# Patient Record
Sex: MALE | Race: OTHER | Employment: FULL TIME | URBAN - METROPOLITAN AREA
[De-identification: names, ages, dates, MRNs, and addresses within clinical notes are randomized per-mention and may not be internally consistent; named-entity substitution may affect disease eponyms.]

---

## 2018-09-23 ENCOUNTER — HOSPITAL ENCOUNTER (EMERGENCY)
Age: 38
Discharge: HOME | End: 2018-09-23
Payer: COMMERCIAL

## 2018-09-23 VITALS
TEMPERATURE: 98.2 F | SYSTOLIC BLOOD PRESSURE: 117 MMHG | DIASTOLIC BLOOD PRESSURE: 72 MMHG | HEART RATE: 63 BPM | OXYGEN SATURATION: 99 % | RESPIRATION RATE: 16 BRPM

## 2018-09-23 DIAGNOSIS — L30.9 ECZEMA, UNSPECIFIED TYPE: Primary | ICD-10-CM

## 2018-09-23 PROCEDURE — 99282 EMERGENCY DEPT VISIT SF MDM: CPT

## 2018-09-23 ASSESSMENT — ENCOUNTER SYMPTOMS: RESPIRATORY NEGATIVE: 1

## 2018-09-23 ASSESSMENT — PAIN DESCRIPTION - ORIENTATION: ORIENTATION: RIGHT;LEFT

## 2018-09-23 ASSESSMENT — PAIN SCALES - GENERAL: PAINLEVEL_OUTOF10: 6

## 2018-09-23 ASSESSMENT — PAIN DESCRIPTION - LOCATION: LOCATION: NECK

## 2018-09-23 NOTE — ED PROVIDER NOTES
[id number] [facility]  eMERGENCY dEPARTMENT eNCOUnter      Pt Name: Shiva Spain  MRN: [id number]  [place] 1980  Date of evaluation: 9/23/2018  Provider: NAINA Baxter CNP     Evaluated by the advanced practice provider    [address] [place] [address]       Chief Complaint   Patient presents with    Rash     rash on both sides of neck for 1 year, worse with hot weather. HISTORY OF PRESENT ILLNESS  (Location/Symptom, Timing/Onset, Context/Setting, Quality, Duration, Modifying Factors, Severity.)   Shiva Spain is a 45 y.o. male who presents to the emergency department Complaining of an itchy scaly thick rash around his neck. Has been present intermittently for several years. Itches and burns from time to time. No other skin eruptions. Nursing Notes were reviewed and I agree. REVIEW OF SYSTEMS    (2-9 systems for level 4, 10 or more for level 5)     Review of Systems   Constitutional: Negative. HENT: Negative. Respiratory: Negative. Cardiovascular: Negative. Musculoskeletal: Negative for arthralgias, joint swelling, neck pain and neck stiffness. Skin: Positive for rash. Allergic/Immunologic: Negative for immunocompromised state. Except as noted above the remainder of the review of systems was reviewed and negative. PAST MEDICAL HISTORY   History reviewed. No pertinent past medical history. SURGICAL HISTORY     History reviewed. No pertinent surgical history. CURRENT MEDICATIONS       Discharge Medication List as of 9/23/2018  7:13 PM          ALLERGIES     Patient has no known allergies. FAMILY HISTORY     History reviewed. No pertinent family history. No family status information on file. SOCIAL HISTORY      reports that he has been smoking Cigarettes. He has been smoking about 0.25 packs per day. He has never used smokeless tobacco. He reports that he does not drink alcohol.     PHYSICAL EXAM    (up to 7 for level 4, 8 or more for level 5)     ED Triage Vitals   BP Temp Temp src Pulse Resp SpO2 Height Weight   -- -- -- -- -- -- -- --       Physical Exam   Constitutional: He is oriented to person, place, and time. He appears well-developed and well-nourished. No distress. HENT:   Head: Normocephalic. Neck: Normal range of motion. Neck supple. Anatomical chart indicates area of mild hyperpigmentation, thickened mildly hypertrophied skin, scaly, flaky. Dry. Irregular borders. No central clearing. Cardiovascular: Normal rate. Pulmonary/Chest: Effort normal.   Neurological: He is alert and oriented to person, place, and time. Skin: Skin is warm and dry. Rash noted. He is not diaphoretic. Nursing note and vitals reviewed. DIAGNOSTIC RESULTS     RADIOLOGY:   Non-plain film images such as CT, Ultrasound and MRI are read by the radiologist. Plain radiographic images are visualized and preliminarily interpreted by NAINA Baxter CNP with the below findings:        Interpretation per the Radiologist below, if available at the time of this note:    No orders to display       LABS:  Labs Reviewed - No data to display    All other labs were within normal range or not returned as of this dictation. EMERGENCY DEPARTMENT COURSE and DIFFERENTIAL DIAGNOSIS/MDM:   Vitals:    Vitals:    09/23/18 1837   BP: 117/72   Pulse: 63   Resp: 16   Temp: 98.2 °F (36.8 °C)   TempSrc: Oral   SpO2: 99%       MDM    Differential diagnosis: Eczema, atopic dermatitis, tinea  Patient was seen and evaluated per myself. Dr. Lazaro Host present and available for consultation as needed. Patient be given triamcinolone for treatment. He is referred to internal medicine for outpatient follow-up. There was no clinical evidence of herpes zoster, cellulitis, abscess, psoriasis. There was no evidence of pityriasis. Impression and plan of care was discussed with the patient.  He is in agreement knees discharged home in good

## 2019-04-14 ENCOUNTER — HOSPITAL ENCOUNTER (EMERGENCY)
Age: 39
Discharge: HOME | End: 2019-04-14
Attending: EMERGENCY MEDICINE
Payer: COMMERCIAL

## 2019-04-14 VITALS
SYSTOLIC BLOOD PRESSURE: 122 MMHG | RESPIRATION RATE: 16 BRPM | DIASTOLIC BLOOD PRESSURE: 77 MMHG | TEMPERATURE: 97.7 F | BODY MASS INDEX: 17.37 KG/M2 | HEIGHT: 68 IN | OXYGEN SATURATION: 100 % | WEIGHT: 114.64 LBS | HEART RATE: 65 BPM

## 2019-04-14 DIAGNOSIS — B07.0 PLANTAR WART OF RIGHT FOOT: ICD-10-CM

## 2019-04-14 DIAGNOSIS — B35.0 TINEA CAPITIS: Primary | ICD-10-CM

## 2019-04-14 DIAGNOSIS — L30.9 ECZEMA, UNSPECIFIED TYPE: ICD-10-CM

## 2019-04-14 PROCEDURE — 99282 EMERGENCY DEPT VISIT SF MDM: CPT

## 2019-04-14 RX ORDER — TRIAMCINOLONE ACETONIDE 1 MG/G
CREAM TOPICAL
Qty: 1 TUBE | Refills: 0 | Status: SHIPPED | OUTPATIENT
Start: 2019-04-14 | End: 2020-02-22 | Stop reason: ALTCHOICE

## 2019-04-14 RX ORDER — TERBINAFINE HYDROCHLORIDE 250 MG/1
250 TABLET ORAL DAILY
Qty: 42 TABLET | Refills: 0 | Status: SHIPPED | OUTPATIENT
Start: 2019-04-14 | End: 2019-05-26

## 2019-04-14 ASSESSMENT — PAIN DESCRIPTION - PROGRESSION
CLINICAL_PROGRESSION_2: GRADUALLY WORSENING
CLINICAL_PROGRESSION: GRADUALLY WORSENING

## 2019-04-14 ASSESSMENT — PAIN SCALES - GENERAL
PAINLEVEL_OUTOF10: 8
PAINLEVEL_OUTOF10: 8

## 2019-04-14 ASSESSMENT — PAIN DESCRIPTION - PAIN TYPE: TYPE: ACUTE PAIN

## 2019-04-14 ASSESSMENT — PAIN DESCRIPTION - INTENSITY: RATING_2: 8

## 2019-04-14 ASSESSMENT — PAIN DESCRIPTION - FREQUENCY: FREQUENCY: CONTINUOUS

## 2019-04-14 ASSESSMENT — PAIN DESCRIPTION - DESCRIPTORS
DESCRIPTORS_2: ITCHING
DESCRIPTORS: ITCHING

## 2019-04-14 ASSESSMENT — PAIN DESCRIPTION - DURATION: DURATION_2: CONTINUOUS

## 2019-04-14 ASSESSMENT — PAIN DESCRIPTION - ONSET: ONSET: GRADUAL

## 2019-04-14 ASSESSMENT — PAIN - FUNCTIONAL ASSESSMENT
PAIN_FUNCTIONAL_ASSESSMENT: 0-10
PAIN_FUNCTIONAL_ASSESSMENT: ACTIVITIES ARE NOT PREVENTED

## 2019-04-14 ASSESSMENT — PAIN DESCRIPTION - LOCATION: LOCATION: NECK

## 2019-04-14 NOTE — ED PROVIDER NOTES
CHIEF COMPLAINT  Rash (pt presents to ED with acute onset of right sided neck/scalp rash that is constantly itching 8/10. + hyperpigmentation noted to right side of neck and anterior crown/scalp)      HISTORY OF PRESENT ILLNESS  Chepe Ballesteros is a 44 y.o. male who presents to the ED complaining of rash on the right side of his neck has been present for the past month. Also states he has 2 areas on his frontal scalp that are pruritic and have him present for the past month. States he has had some hair loss in the area. Denies any pain with the rashes. No drainage coming from the lesions. States he did have something similar approximately one year ago in which he used a cream which did provide relief. States he is not sure what cream he was using at the time. No fevers or chills. No other rashes on the body. Denies taking any medications daily. No new hygiene products, soaps or detergents. No known allergies. Also complains of a wart on his right foot near his heel. States it is painful. Denies any bleeding or swelling around the area. No other complaints, modifying factors or associated symptoms. Nursing notes reviewed. History reviewed. No pertinent past medical history. History reviewed. No pertinent surgical history. History reviewed. No pertinent family history.   Social History     Socioeconomic History    Marital status:      Spouse name: Not on file    Number of children: Not on file    Years of education: Not on file    Highest education level: Not on file   Occupational History    Not on file   Social Needs    Financial resource strain: Not on file    Food insecurity:     Worry: Not on file     Inability: Not on file    Transportation needs:     Medical: Not on file     Non-medical: Not on file   Tobacco Use    Smoking status: Current Every Day Smoker     Packs/day: 0.25     Types: Cigarettes    Smokeless tobacco: Never Used   Substance and Sexual Activity    Alcohol use: No    Drug use: Never    Sexual activity: Not on file   Lifestyle    Physical activity:     Days per week: Not on file     Minutes per session: Not on file    Stress: Not on file   Relationships    Social connections:     Talks on phone: Not on file     Gets together: Not on file     Attends Church service: Not on file     Active member of club or organization: Not on file     Attends meetings of clubs or organizations: Not on file     Relationship status: Not on file    Intimate partner violence:     Fear of current or ex partner: Not on file     Emotionally abused: Not on file     Physically abused: Not on file     Forced sexual activity: Not on file   Other Topics Concern    Not on file   Social History Narrative    Not on file     No current facility-administered medications for this encounter. Current Outpatient Medications   Medication Sig Dispense Refill    terbinafine (LAMISIL) 250 MG tablet Take 1 tablet by mouth daily 42 tablet 0    triamcinolone (KENALOG) 0.1 % cream Apply topically 2 times daily for 1 week.  1 Tube 0    Salicylic Acid (WART REMOVER) 40 % PADS Apply 1 strip topically daily 20 each 0     No Known Allergies      REVIEW OF SYSTEMS  10 systems reviewed, pertinent positives per HPI otherwise noted to be negative    PHYSICAL EXAM  /77   Pulse 65   Temp 97.7 °F (36.5 °C) (Oral)   Resp 16   Ht 5' 8\" (1.727 m)   Wt 114 lb 10.2 oz (52 kg)   SpO2 100%   BMI 17.43 kg/m²      CONSTITUTIONAL: AOx4, NAD, cooperative with exam, afebrile   HEAD: normocephalic, atraumatic, 2 discrete areas of 3 cm x 3 cm scaling, hair loss on frontal scalp    EYES: PERRL, EOMI, anicteric sclera   ENT: Moist mucous membranes, uvula midline   NECK: Supple, symmetric, trachea midline, see skin section for description of rash    LUNGS: Bilateral breath sounds, CTAB, no rales/ronchi/wheezes   CARDIOVASCULAR: RRR, normal S1/S2, no m/r/g, 2+ pulses throughout   ABDOMEN: Soft, non-tender, non-distended, +BS   NEUROLOGIC:  MAEx4, GCS 15    MUSCULOSKELETAL: No clubbing, cyanosis or edema   SKIN: 4 cm x 10 cm area of lichenification and excoriations without fluctuance or drainage, no erythema     Plantar wart present on the right heel on the lateral edge of the foot, no erythema or fluctuance          RADIOLOGY  X-RAYS:  I have reviewed radiologic plain film image(s). ALL OTHER NON-PLAIN FILM IMAGES SUCH AS CT, ULTRASOUND AND MRI HAVE BEEN READ BY THE RADIOLOGIST. No orders to display          EKG INTERPRETATION  None    PROCEDURES    ED COURSE/MDM  Differential diagnosis: Eczema versus contact dermatitis versus tinea versus plantar wart versus blister versus callus    44year-old nontoxic, afebrile male presenting with rash on the right side of his neck that is consistent with eczema as well as 2 areas on the frontal scalp consistent with tinea capitis. Patient has had relief of the area of lichenification and pruritus on his neck with previous steroid cream.  We'll re-fill patient's triamcinolone cream.  We'll also start patient on terbinafine tablets for tinea capitis and provide salicylic acid pads for plantar wart. Patient provided a PCP referral for follow-up. All questions answered prior to discharge. I estimate there is LOW risk for CELLULITIS, NECROTIZING FASCIITIS, ALLERGIC REACTION INVOLVING AIRWAY, or ABSCESS, thus I consider the discharge disposition reasonable. Also, there is no evidence or peritonitis, sepsis, or toxicity. Reinaldo Sanabria and I have discussed the diagnosis and risks, and we agree with discharging home to follow-up with their primary doctor. We also discussed returning to the Emergency Department immediately if new or worsening symptoms occur. We have discussed the symptoms which are most concerning (e.g., changing or worsening pain, fever, numbness, weakness, cool or painful digits) that necessitate immediate return.     Patient was given scripts for the following medications. I counseled patient how to take these medications. Discharge Medication List as of 4/14/2019  5:13 PM      START taking these medications    Details   terbinafine (LAMISIL) 250 MG tablet Take 1 tablet by mouth daily, Disp-42 tablet, R-0Print      triamcinolone (KENALOG) 0.1 % cream Apply topically 2 times daily for 1 week., Disp-1 Tube, R-0, Print      Salicylic Acid (WART REMOVER) 40 % PADS Apply 1 strip topically daily, Disp-20 each, R-0Print             CLINICAL IMPRESSION  1. Tinea capitis    2. Eczema, unspecified type    3. Plantar wart of right foot        Blood pressure 122/77, pulse 65, temperature 97.7 °F (36.5 °C), temperature source Oral, resp. rate 16, height 5' 8\" (1.727 m), weight 114 lb 10.2 oz (52 kg), SpO2 100 %. DISPOSITION  Patient was discharged to home in good condition. [facility]  [phone]  Call in 1 day  For a follow up appointment. Disclaimer: All medical record entries made by [place] dictation.       (Please note that this note was completed with a voice recognition program. Every attempt was made to edit the dictations, but inevitably there remain words that are mis-transcribed.)          Shelia White MD  04/14/19 6823

## 2020-02-22 ENCOUNTER — HOSPITAL ENCOUNTER (EMERGENCY)
Age: 40
Discharge: HOME | End: 2020-02-22
Payer: COMMERCIAL

## 2020-02-22 VITALS
HEART RATE: 72 BPM | DIASTOLIC BLOOD PRESSURE: 82 MMHG | HEIGHT: 69 IN | BODY MASS INDEX: 16.42 KG/M2 | TEMPERATURE: 99.8 F | SYSTOLIC BLOOD PRESSURE: 134 MMHG | WEIGHT: 110.89 LBS | RESPIRATION RATE: 18 BRPM | OXYGEN SATURATION: 98 %

## 2020-02-22 LAB
RAPID INFLUENZA  B AGN: POSITIVE
RAPID INFLUENZA A AGN: NEGATIVE
S PYO AG THROAT QL: NEGATIVE

## 2020-02-22 PROCEDURE — 87880 STREP A ASSAY W/OPTIC: CPT

## 2020-02-22 PROCEDURE — 87804 INFLUENZA ASSAY W/OPTIC: CPT

## 2020-02-22 PROCEDURE — 87081 CULTURE SCREEN ONLY: CPT

## 2020-02-22 PROCEDURE — 6370000000 HC RX 637 (ALT 250 FOR IP): Performed by: PHYSICIAN ASSISTANT

## 2020-02-22 PROCEDURE — 99283 EMERGENCY DEPT VISIT LOW MDM: CPT

## 2020-02-22 RX ORDER — ACETAMINOPHEN 500 MG
1000 TABLET ORAL EVERY 6 HOURS PRN
Qty: 40 TABLET | Refills: 0 | Status: SHIPPED | OUTPATIENT
Start: 2020-02-22

## 2020-02-22 RX ORDER — ACETAMINOPHEN 500 MG
1000 TABLET ORAL ONCE
Status: COMPLETED | OUTPATIENT
Start: 2020-02-22 | End: 2020-02-22

## 2020-02-22 RX ORDER — IBUPROFEN 600 MG/1
600 TABLET ORAL ONCE
Status: COMPLETED | OUTPATIENT
Start: 2020-02-22 | End: 2020-02-22

## 2020-02-22 RX ORDER — IBUPROFEN 600 MG/1
600 TABLET ORAL EVERY 6 HOURS PRN
Qty: 30 TABLET | Refills: 0 | Status: SHIPPED | OUTPATIENT
Start: 2020-02-22

## 2020-02-22 RX ORDER — OSELTAMIVIR PHOSPHATE 75 MG/1
75 CAPSULE ORAL 2 TIMES DAILY
Qty: 10 CAPSULE | Refills: 0 | Status: SHIPPED | OUTPATIENT
Start: 2020-02-22 | End: 2020-02-27

## 2020-02-22 RX ADMIN — ACETAMINOPHEN 1000 MG: 500 TABLET ORAL at 20:28

## 2020-02-22 RX ADMIN — IBUPROFEN 600 MG: 600 TABLET, FILM COATED ORAL at 20:28

## 2020-02-22 ASSESSMENT — PAIN SCALES - GENERAL
PAINLEVEL_OUTOF10: 5

## 2020-02-22 ASSESSMENT — PAIN - FUNCTIONAL ASSESSMENT: PAIN_FUNCTIONAL_ASSESSMENT: 0-10

## 2020-02-22 ASSESSMENT — ENCOUNTER SYMPTOMS
ABDOMINAL PAIN: 0
SORE THROAT: 1
BACK PAIN: 0
SHORTNESS OF BREATH: 0
VOMITING: 0
NAUSEA: 0
COUGH: 1

## 2020-02-23 NOTE — ED NOTES
Patient remains in pain but states \"just have to take time\" EMD aware, reviewed instructions with patient, verb under, discharged home     Mignon Whitaker, [address] [facility]  02/22/20 2038

## 2020-02-23 NOTE — ED PROVIDER NOTES
[address] ENCOUNTER      Pt Name: Aston Fraser  MRN: [id number]  [place] 1980  Date of evaluation: 2/22/2020  Provider: Radha Glover PA-C    This patient was not seen and evaluated by the attending physician No att. providers found. CHIEF COMPLAINT       Chief Complaint   Patient presents with    Headache     x3days, nasal congestion, some sore throat, occasional cough, denies hx asthma, BBS= clear    Chills    Generalized Body Aches         HISTORYOF PRESENT ILLNESS  (Location/Symptom, Timing/Onset, Context/Setting, Quality, Duration, Modifying Factors, Severity.)   Aston Fraser is a 36 y.o. male who presents to the emergency department complaining of congestion, body aches, sore throat, cough, headache, fever and chills which started 2 days ago. Nothing makes his symptoms better or worse. He has taken nothing for it. He rates pain 5 out of 10. He reports feeling feverish but did not check his temperature. Denies vomiting or diarrhea. Denies chest pain or shortness of breath. Nursing Notes were reviewedand I agree. REVIEW OF SYSTEMS    (2-9 systems for level 4, 10 or more forlevel 5)     Review of Systems   Constitutional: Positive for chills, fatigue and fever. HENT: Positive for congestion and sore throat. Respiratory: Positive for cough. Negative for shortness of breath. Cardiovascular: Negative for chest pain. Gastrointestinal: Negative for abdominal pain, nausea and vomiting. Genitourinary: Negative for difficulty urinating and dysuria. Musculoskeletal: Positive for myalgias. Negative for back pain. Skin: Negative for rash. Neurological: Positive for headaches. Negative for light-headedness. Psychiatric/Behavioral: The patient is not nervous/anxious. All other systems reviewed and are negative. Except as noted above the remainder ofthe review of systems was reviewed and negative.        PAST new or worsening symptoms occur. We have discussed the symptoms which are most concerning that necessitate immediatereturn. PROCEDURES:  None    FINAL IMPRESSION      1.  Influenza B          DISPOSITION/PLAN   DISPOSITION Discharge - Pending Orders Complete 02/22/2020 08:26:33 PM      PATIENT REFERRED TO:  Elvis Jain  [phone]  Schedule an appointment as soon as possible for a visit       [address]  [place] [address]  [phone]    If symptoms worsen      MEDICATIONS:  New Prescriptions    ACETAMINOPHEN (APAP EXTRA STRENGTH) 500 MG TABLET    Take 2 tablets by mouth every 6 hours as needed for Pain    IBUPROFEN (IBU) 600 MG TABLET    Take 1 tablet by mouth every 6 hours as needed for Pain    OSELTAMIVIR (TAMIFLU) 75 MG CAPSULE    Take 1 capsule by mouth 2 times daily for 10 doses       (Please note that portions of this note were completed with a voice recognition program.  Efforts were made toedit the dictations but occasionally words are mis-transcribed.)    Inda Kayser, PA-C Inda Kayser, PA-C  02/22/20 2031

## 2020-02-25 LAB — S PYO THROAT QL CULT: NORMAL

## 2021-04-13 ENCOUNTER — OFFICE VISIT (OUTPATIENT)
Dept: PRIMARY CARE CLINIC | Age: 41
End: 2021-04-13
Payer: COMMERCIAL

## 2021-04-13 VITALS
SYSTOLIC BLOOD PRESSURE: 122 MMHG | HEIGHT: 69 IN | DIASTOLIC BLOOD PRESSURE: 84 MMHG | BODY MASS INDEX: 16.5 KG/M2 | WEIGHT: 111.4 LBS

## 2021-04-13 DIAGNOSIS — R63.0 POOR APPETITE: ICD-10-CM

## 2021-04-13 DIAGNOSIS — G89.29 CHRONIC RIGHT-SIDED LOW BACK PAIN WITHOUT SCIATICA: Primary | ICD-10-CM

## 2021-04-13 DIAGNOSIS — Z13.228 ENCOUNTER FOR SCREENING FOR OTHER METABOLIC DISORDERS: ICD-10-CM

## 2021-04-13 DIAGNOSIS — Z13.220 SCREENING FOR LIPID DISORDERS: ICD-10-CM

## 2021-04-13 DIAGNOSIS — M54.50 CHRONIC RIGHT-SIDED LOW BACK PAIN WITHOUT SCIATICA: Primary | ICD-10-CM

## 2021-04-13 DIAGNOSIS — Z13.29 SCREENING FOR THYROID DISORDER: ICD-10-CM

## 2021-04-13 DIAGNOSIS — B07.0 PLANTAR WART: ICD-10-CM

## 2021-04-13 DIAGNOSIS — Z11.59 NEED FOR HEPATITIS C SCREENING TEST: ICD-10-CM

## 2021-04-13 DIAGNOSIS — Z01.84 ENCOUNTER FOR ANTIBODY RESPONSE EXAMINATION: ICD-10-CM

## 2021-04-13 DIAGNOSIS — Z11.4 SCREENING FOR HIV (HUMAN IMMUNODEFICIENCY VIRUS): ICD-10-CM

## 2021-04-13 DIAGNOSIS — E55.9 VITAMIN D DEFICIENCY: ICD-10-CM

## 2021-04-13 DIAGNOSIS — Z13.1 SCREENING FOR DIABETES MELLITUS: ICD-10-CM

## 2021-04-13 DIAGNOSIS — B35.0 TINEA CAPITIS: ICD-10-CM

## 2021-04-13 PROCEDURE — G8419 CALC BMI OUT NRM PARAM NOF/U: HCPCS | Performed by: NURSE PRACTITIONER

## 2021-04-13 PROCEDURE — G8427 DOCREV CUR MEDS BY ELIG CLIN: HCPCS | Performed by: NURSE PRACTITIONER

## 2021-04-13 PROCEDURE — 4004F PT TOBACCO SCREEN RCVD TLK: CPT | Performed by: NURSE PRACTITIONER

## 2021-04-13 PROCEDURE — 99204 OFFICE O/P NEW MOD 45 MIN: CPT | Performed by: NURSE PRACTITIONER

## 2021-04-13 RX ORDER — KETOCONAZOLE 20 MG/G
CREAM TOPICAL
Qty: 60 G | Refills: 1 | Status: SHIPPED | OUTPATIENT
Start: 2021-04-13 | End: 2022-08-30

## 2021-04-13 RX ORDER — NAPROXEN 500 MG/1
500 TABLET ORAL 2 TIMES DAILY WITH MEALS
Qty: 60 TABLET | Refills: 0 | Status: SHIPPED | OUTPATIENT
Start: 2021-04-13

## 2021-04-13 ASSESSMENT — ENCOUNTER SYMPTOMS
FACIAL SWELLING: 0
SINUS PAIN: 0
TROUBLE SWALLOWING: 0
CHEST TIGHTNESS: 0
VOMITING: 0
SORE THROAT: 0
EYE PAIN: 0
DIARRHEA: 0
WHEEZING: 0
ABDOMINAL PAIN: 0
NAUSEA: 0
SHORTNESS OF BREATH: 0
COUGH: 0

## 2021-04-13 ASSESSMENT — PATIENT HEALTH QUESTIONNAIRE - PHQ9
SUM OF ALL RESPONSES TO PHQ QUESTIONS 1-9: 0
SUM OF ALL RESPONSES TO PHQ QUESTIONS 1-9: 0
1. LITTLE INTEREST OR PLEASURE IN DOING THINGS: 0

## 2021-04-13 NOTE — PROGRESS NOTES
Negative for environmental allergies and food allergies. Neurological: Negative for dizziness, syncope, weakness, numbness and headaches. Hematological: Negative for adenopathy. Does not bruise/bleed easily. Psychiatric/Behavioral: Negative for dysphoric mood and suicidal ideas. Prior to Visit Medications    Medication Sig Taking? Authorizing Provider   ketoconazole (NIZORAL) 2 % cream Apply topically daily 3-4 weeks. Leave on for 15 min and then wash off. Yes Kadie Saucer, APRN - CNP   naproxen (NAPROSYN) 500 MG tablet Take 1 tablet by mouth 2 times daily (with meals) Yes Kadie Saucer, APRN - CNP   ibuprofen (IBU) 600 MG tablet Take 1 tablet by mouth every 6 hours as needed for Pain Yes Jones Patel PA-C   acetaminophen (APAP EXTRA STRENGTH) 500 MG tablet Take 2 tablets by mouth every 6 hours as needed for Pain Yes Jones Patel PA-C        No Known Allergies    Past Medical History:   Diagnosis Date    Influenza B 02/22/2020       No past surgical history on file.     Social History     Socioeconomic History    Marital status:      Spouse name: Not on file    Number of children: Not on file    Years of education: Not on file    Highest education level: Not on file   Occupational History    Not on file   Social Needs    Financial resource strain: Not on file    Food insecurity     Worry: Not on file     Inability: Not on file    Transportation needs     Medical: Not on file     Non-medical: Not on file   Tobacco Use    Smoking status: Current Some Day Smoker     Packs/day: 0.25     Types: Cigarettes    Smokeless tobacco: Never Used   Substance and Sexual Activity    Alcohol use: No    Drug use: Never    Sexual activity: Not on file   Lifestyle    Physical activity     Days per week: Not on file     Minutes per session: Not on file    Stress: Not on file   Relationships    Social connections     Talks on phone: Not on file     Gets together: Not on file     Attends of motion. Lumbar back: He exhibits tenderness and pain. Lymphadenopathy:      Cervical: No cervical adenopathy. Skin:     General: Skin is warm and dry. Capillary Refill: Capillary refill takes less than 2 seconds. Coloration: Skin is not pale. Findings: Lesion and rash present. No erythema. Rash is scaling. Comments: 4 cm x 10 cm area of lichenification and excoriation of anterior scalp without fluctuance or drainage, no erythema or swelling     Plantar wart present on the right heel , no erythema, swelling, or discharge       Neurological:      General: No focal deficit present. Mental Status: He is alert and oriented to person, place, and time. Deep Tendon Reflexes: Reflexes are normal and symmetric. Psychiatric:         Mood and Affect: Mood normal.         Behavior: Behavior normal.         Judgment: Judgment normal.         ASSESSMENT/PLAN:  1. Chronic right-sided low back pain without sciatica  >Unchanged  -     XR LUMBAR SPINE (2-3 VIEWS); Future  -     naproxen (NAPROSYN) 500 MG tablet; Take 1 tablet by mouth 2 times daily (with meals), Disp-60 tablet, R-0Normal  2. Plantar wart  >Unchanged  -     [facility] - RootLesvia bass., DPM, Podiatry, [place]  3. Tinea capitis  >Unchanged  -     Eden Almaguer MD, Dermatology, [facility]  -     ketoconazole (NIZORAL) 2 % cream; Apply topically daily 3-4 weeks. Leave on for 15 min and then wash off., Disp-60 g, R-1, Normal  4. Poor appetite  >Unchanged  -     HEPATITIS PANEL, ACUTE; Future  -     C-REACTIVE PROTEIN; Future  -     LACTATE DEHYDROGENASE; Future  -     SEDIMENTATION RATE; Future  -     Urinalysis; Future  5. Encounter for screening for other metabolic disorders  -     CBC Auto Differential; Future  -     Comprehensive Metabolic Panel; Future  6. Screening for diabetes mellitus  -     Hemoglobin A1C; Future  7. Screening for thyroid disorder  -     TSH with Reflex; Future  8.  Screening for lipid disorders  -     Lipid Panel; Future  9. Screening for HIV (human immunodeficiency virus)  -     HIV Screen; Future  10. Need for hepatitis C screening test  11. Encounter for antibody response examination  -     Varicella-Zoster Virus (VZV) Antibodies IgG & IgM; Future  12. Vitamin D deficiency  -     Vitamin D 25 Hydroxy; Future        I have spent a total 45 minutes face-to-face with this patient and/or guardian. Over 50% of this time was spent on counseling and care coordination. Return in about 4 weeks (around 5/11/2021) for EXTENDED.

## 2021-04-13 NOTE — PATIENT INSTRUCTIONS
Patient Education        Ringworm of the Scalp: Care Instructions  Your Care Instructions  Ringworm is a fungus infection of the skin. It is not caused by a worm or bug. Ringworm causes round patches of baldness or scaly skin on the scalp. Ringworm of the scalp is most common in children 1to 5years old. Sometimes a blister-like rash appears on the face with ringworm of the scalp. This is an allergic reaction that usually clears when the ringworm is treated. The fungus that causes ringworm of the scalp spreads from person to person. You can catch ringworm by sharing hats, zepeda, brushes, towels, telephones, or sports equipment. You can also get it by touching a person with ringworm. Once in a while, it can also spread from a dog or cat to a person. Ringworm of the scalp is treated with pills. Ringworm may come back after treatment. Treating ringworm of the scalp can prevent scarring and permanent hair loss. Follow-up care is a key part of your treatment and safety. Be sure to make and go to all appointments, and call your doctor if you are having problems. It's also a good idea to know your test results and keep a list of the medicines you take. How can you care for yourself at home? · Take your medicines exactly as prescribed. Call your doctor if you have any problems with your medicine. · Ask your doctor if a shampoo might help. Special shampoos for ringworm contain selenium sulfide or ketoconazole. Your doctor can let you know if and how often you can use one. · To prevent spreading ringworm:  ? As soon as you start treatment, throw away your zepeda and brushes, and buy new ones. Do not share hats, sport equipment, or other objects. Ringworm-causing fungus can live on objects, people, or animals for several months. ? Wash your hands well after caring for someone with ringworm. Adults who have contact with a child with ringworm of the scalp can become a carrier.  A carrier does not have a ringworm infection but can pass ringworm to others. ? Wash your clothes, towels, and bed sheets in hot, soapy water. When should you call for help? Call your doctor now or seek immediate medical care if:    · You have signs of infection such as:  ? Increased pain, swelling, redness, tenderness, or heat. ? Red streaks extending from the area. ? Pus coming from the rash on your skin. ? A fever. Watch closely for changes in your health, and be sure to contact your doctor if:    · Your ringworm does not improve after 2 weeks of treatment.     · You do not get better as expected. Where can you learn more? Go to https://L[employer] Hel[employer] Handpepiceweb.[employer]. org and sign in to your [employer] account. Enter 3991 0173 in the Unigo box to learn more about \"Ringworm of the Scalp: Care Instructions. \"     If you do not have an account, please click on the \"Sign Up Now\" link. Current as of: July 2, 2020               Content Version: 12.8  © 2006-2021 [employer]. Care instructions adapted under license by [facility] ([facility]). If you have questions about a medical condition or this instruction, always ask your healthcare professional. [place] [address] any warranty or liability for your use of this information. Patient Education        Low Back Pain: Exercises  Introduction  Here are some examples of exercises for you to try. The exercises may be suggested for a condition or for rehabilitation. Start each exercise slowly. Ease off the exercises if you start to have pain. You will be told when to start these exercises and which ones will work best for you. How to do the exercises  Press-up   1. Lie on your stomach, supporting your body with your forearms. 2. Press your elbows down into the floor to raise your upper back. As you do this, relax your stomach muscles and allow your back to arch without using your back muscles.  As your press up, do not let your hips or pelvis come off the floor.  3. Hold for 15 to 30 seconds, then relax. 4. Repeat 2 to 4 times. Alternate arm and leg (bird dog) exercise   Do this exercise slowly. Try to keep your body straight at all times, and do not let one hip drop lower than the other. 1. Start on the floor, on your hands and knees. 2. Tighten your belly muscles. 3. Raise one leg off the floor, and hold it straight out behind you. Be careful not to let your hip drop down, because that will twist your trunk. 4. Hold for about 6 seconds, then lower your leg and switch to the other leg. 5. Repeat 8 to 12 times on each leg. 6. Over time, work up to holding for 10 to 30 seconds each time. 7. If you feel stable and secure with your leg raised, try raising the opposite arm straight out in front of you at the same time. Knee-to-chest exercise   1. Lie on your back with your knees bent and your feet flat on the floor. 2. Bring one knee to your chest, keeping the other foot flat on the floor (or keeping the other leg straight, whichever feels better on your lower back). 3. Keep your lower back pressed to the floor. Hold for at least 15 to 30 seconds. 4. Relax, and lower the knee to the starting position. 5. Repeat with the other leg. Repeat 2 to 4 times with each leg. 6. To get more stretch, put your other leg flat on the floor while pulling your knee to your chest.    Curl-ups   1. Lie on the floor on your back with your knees bent at a 90-degree angle. Your feet should be flat on the floor, about 12 inches from your buttocks. 2. Cross your arms over your chest. If this bothers your neck, try putting your hands behind your neck (not your head), with your elbows spread apart. 3. Slowly tighten your belly muscles and raise your shoulder blades off the floor. 4. Keep your head in line with your body, and do not press your chin to your chest.  5. Hold this position for 1 or 2 seconds, then slowly lower yourself back down to the floor.   6. Repeat 8 to 12 often don't show the reason for your pain. If your pain lasts more than 6 weeks or you have symptoms that your doctor is more concerned about, then your doctor may order tests. These may include an X-ray, a CT scan, or an MRI. Sometimes other tests such as a bone scan or nerve conduction test may be done. How is low back pain treated? Most acute low back pain gets better on its own within a few weeks, no matter what the cause. Time and doing usual activities are all that most people need to feel better. Using heat or ice and taking over-the-counter pain medicine also can help while your body heals. If you aren't getting better on your own or your pain is very bad, your doctor may recommend:  · Physical therapy. · Spinal manipulation, such as by a chiropractor. · Acupuncture. · Massage. · Injections of steroid medicine in your back (especially for pain that involves your legs). If you have chronic low back pain, treatment will help you understand and manage your pain. Treatment may include:  · Staying active. This may include walking or doing back exercises. · Physical therapy. · Medicines. Some of these medicines are also used for other problems, like depression. · Pain management. Your doctor may have you see a pain specialist.  · Counseling. Having chronic pain can be hard. It may help to talk to someone who can help you cope with your pain. Surgery isn't needed for most people. But it may help some types of low back pain. Follow-up care is a key part of your treatment and safety. Be sure to make and go to all appointments, and call your doctor if you are having problems. It's also a good idea to know your test results and keep a list of the medicines you take. When should you call for help? Call 911 anytime you think you may need emergency care. For example, call if:  · You can't move a leg at all.   Call your doctor now or seek immediate medical care if:  · You have new or worse symptoms in your legs, belly, or buttocks. Symptoms may include:  ? Numbness or tingling. ? Weakness. ? Pain. · You lose bladder or bowel control. Watch closely for changes in your health, and be sure to contact your doctor if:  · Along with the back pain, you have a fever, lose weight, or don't feel well. · You do not get better as expected. Where can you learn more? Go to https://[employer] Reg[employer]Infinium Metalseb.[employer]. org and sign in to your [employer] account. Enter A007 in the [employer] box to learn more about \"Learning About Low Back Pain. \"     If you do not have an account, please click on the \"Sign Up Now\" link. Current as of: November 16, 2020               Content Version: 12.8  © 7301-9072 [employer]. Care instructions adapted under license by [facility] ([facility]). If you have questions about a medical condition or this instruction, always ask your healthcare professional. [place] [address] any warranty or liability for your use of this information.

## 2021-04-19 ASSESSMENT — ENCOUNTER SYMPTOMS
BACK PAIN: 1
BLOOD IN STOOL: 0
CONSTIPATION: 0

## 2021-07-01 ENCOUNTER — APPOINTMENT (OUTPATIENT)
Dept: GENERAL RADIOLOGY | Age: 41
End: 2021-07-01
Payer: COMMERCIAL

## 2021-07-01 ENCOUNTER — HOSPITAL ENCOUNTER (EMERGENCY)
Age: 41
Discharge: HOME | End: 2021-07-01
Payer: COMMERCIAL

## 2021-07-01 VITALS
SYSTOLIC BLOOD PRESSURE: 108 MMHG | RESPIRATION RATE: 14 BRPM | HEIGHT: 69 IN | BODY MASS INDEX: 16.1 KG/M2 | OXYGEN SATURATION: 99 % | TEMPERATURE: 100 F | WEIGHT: 108.69 LBS | HEART RATE: 72 BPM | DIASTOLIC BLOOD PRESSURE: 67 MMHG

## 2021-07-01 DIAGNOSIS — Z20.822 COVID-19 VIRUS TEST RESULT UNKNOWN: ICD-10-CM

## 2021-07-01 DIAGNOSIS — J06.9 VIRAL URI WITH COUGH: Primary | ICD-10-CM

## 2021-07-01 PROCEDURE — 99284 EMERGENCY DEPT VISIT MOD MDM: CPT

## 2021-07-01 PROCEDURE — U0005 INFEC AGEN DETEC AMPLI PROBE: HCPCS

## 2021-07-01 PROCEDURE — 6370000000 HC RX 637 (ALT 250 FOR IP): Performed by: PHYSICIAN ASSISTANT

## 2021-07-01 PROCEDURE — U0003 INFECTIOUS AGENT DETECTION BY NUCLEIC ACID (DNA OR RNA); SEVERE ACUTE RESPIRATORY SYNDROME CORONAVIRUS 2 (SARS-COV-2) (CORONAVIRUS DISEASE [COVID-19]), AMPLIFIED PROBE TECHNIQUE, MAKING USE OF HIGH THROUGHPUT TECHNOLOGIES AS DESCRIBED BY CMS-2020-01-R: HCPCS

## 2021-07-01 PROCEDURE — 71045 X-RAY EXAM CHEST 1 VIEW: CPT

## 2021-07-01 RX ORDER — ACETAMINOPHEN 500 MG
1000 TABLET ORAL ONCE
Status: COMPLETED | OUTPATIENT
Start: 2021-07-01 | End: 2021-07-01

## 2021-07-01 RX ORDER — FLUTICASONE PROPIONATE 50 MCG
SPRAY, SUSPENSION (ML) NASAL
Qty: 1 BOTTLE | Refills: 0 | Status: SHIPPED | OUTPATIENT
Start: 2021-07-01

## 2021-07-01 RX ORDER — GUAIFENESIN/DEXTROMETHORPHAN 100-10MG/5
10 SYRUP ORAL ONCE
Status: COMPLETED | OUTPATIENT
Start: 2021-07-01 | End: 2021-07-01

## 2021-07-01 RX ORDER — GUAIFENESIN AND DEXTROMETHORPHAN HYDROBROMIDE 100; 10 MG/5ML; MG/5ML
10 SOLUTION ORAL EVERY 6 HOURS PRN
Qty: 236 ML | Refills: 0 | Status: SHIPPED | OUTPATIENT
Start: 2021-07-01

## 2021-07-01 RX ADMIN — GUAIFENESIN SYRUP AND DEXTROMETHORPHAN 10 ML: 100; 10 SYRUP ORAL at 20:23

## 2021-07-01 RX ADMIN — ACETAMINOPHEN 1000 MG: 500 TABLET ORAL at 20:23

## 2021-07-01 ASSESSMENT — PAIN DESCRIPTION - PAIN TYPE: TYPE: ACUTE PAIN

## 2021-07-01 ASSESSMENT — PAIN DESCRIPTION - LOCATION: LOCATION: HEAD

## 2021-07-01 ASSESSMENT — PAIN SCALES - GENERAL
PAINLEVEL_OUTOF10: 10
PAINLEVEL_OUTOF10: 10
PAINLEVEL_OUTOF10: 4
PAINLEVEL_OUTOF10: 4

## 2021-07-01 ASSESSMENT — PAIN - FUNCTIONAL ASSESSMENT: PAIN_FUNCTIONAL_ASSESSMENT: 0-10

## 2021-07-02 ENCOUNTER — CARE COORDINATION (OUTPATIENT)
Dept: CARE COORDINATION | Age: 41
End: 2021-07-02

## 2021-07-02 LAB — SARS-COV-2: NOT DETECTED

## 2021-07-02 NOTE — ED PROVIDER NOTES
daily 3-4 weeks. Leave on for 15 min and then wash off. NAPROXEN (NAPROSYN) 500 MG TABLET    Take 1 tablet by mouth 2 times daily (with meals)         Review of Systems:  (2-9 systems needed)  Review of Systems    \"Positives and Pertinent negatives as per HPI\"    Physical Exam:  Physical Exam  Vitals and nursing note reviewed. Constitutional:       Appearance: Normal appearance. He is not diaphoretic. HENT:      Head: Normocephalic and atraumatic. Right Ear: External ear normal.      Left Ear: External ear normal.      Nose: Congestion and rhinorrhea present. No nasal deformity, signs of injury or nasal tenderness. Right Sinus: Frontal sinus tenderness present. Left Sinus: Frontal sinus tenderness present. Mouth/Throat:      Lips: No lesions. Mouth: Mucous membranes are moist.      Pharynx: Uvula midline. No posterior oropharyngeal erythema. Tonsils: No tonsillar exudate. Eyes:      General:         Right eye: No discharge. Left eye: No discharge. Conjunctiva/sclera: Conjunctivae normal.   Cardiovascular:      Rate and Rhythm: Normal rate and regular rhythm. Pulses: Normal pulses. Heart sounds: Normal heart sounds. No murmur heard. No gallop. Pulmonary:      Effort: Pulmonary effort is normal. No respiratory distress. Breath sounds: Normal breath sounds. No wheezing, rhonchi or rales. Abdominal:      General: Bowel sounds are normal.      Palpations: Abdomen is soft. Tenderness: There is no abdominal tenderness. Musculoskeletal:         General: No swelling, tenderness, deformity or signs of injury. Normal range of motion. Cervical back: Normal range of motion and neck supple. Skin:     General: Skin is warm and dry. Capillary Refill: Capillary refill takes less than 2 seconds. Findings: No lesion or rash. Neurological:      General: No focal deficit present.       Mental Status: He is alert and oriented to person, place, and time. Psychiatric:         Mood and Affect: Mood normal.         Behavior: Behavior normal.         Thought Content: Thought content normal.         Judgment: Judgment normal.         MEDICAL DECISION MAKING    Vitals:    Vitals:    07/01/21 2008   BP: 126/82   Pulse: 76   Resp: 16   Temp: 100 °F (37.8 °C)   TempSrc: Oral   SpO2: 99%   Weight: 108 lb 11 oz (49.3 kg)   Height: 5' 9\" (1.753 m)       LABS:  Labs Reviewed   COVID-19        Remainder of labs reviewed and were negative at this time or not returned at the time of this note. RADIOLOGY:   Non-plain film images such as CT, Ultrasound and MRI are read by the radiologist. Tiffany Borges PA-C have directly visualized the radiologic plain film image(s) with the below findings:      Interpretation per the Radiologist below, if available at the time of this note:    XR CHEST PORTABLE   Final Result   Unremarkable portable exam              XR CHEST PORTABLE    Result Date: 7/1/2021  EXAMINATION: ONE XRAY VIEW OF THE CHEST 7/1/2021 8:24 pm COMPARISON: None. HISTORY: ORDERING SYSTEM PROVIDED HISTORY: cough with fever/sick 2 days TECHNOLOGIST PROVIDED HISTORY: Reason for exam:->cough with fever/sick 2 days Reason for Exam: cough with fever/sick 2 days Acuity: Acute Type of Exam: Initial Additional signs and symptoms: Headache (c/o headache and runny nose feeling cold today no otc med taken ) Relevant Medical/Surgical History: Current Some Day Smoker, 0.25 ppd FINDINGS: The heart and pulmonary vascularity are within normal limits. There are no focal areas of consolidation or pleural effusion. The osseous structures are intact.      Unremarkable portable exam          MEDICAL DECISION MAKING / ED COURSE:      PROCEDURES:   Procedures    None    Patient was given:  Medications   acetaminophen (TYLENOL) tablet 1,000 mg (1,000 mg Oral Given 7/1/21 2023)   guaiFENesin-dextromethorphan (ROBITUSSIN DM) 100-10 MG/5ML syrup 10 mL (10 mLs Oral Given 7/1/21 2023)   This patient presents as above and evaluation and treatment is begun here. Chest x-ray obtained. This returns negative as above. Also Covid swab obtained. This is pending at this time and will not come back for the next 48 hours or so. Patient does have history of physical exam is consistent with mild viral URI. No indication of bacterial infection at this time including lower respiratory tract infection. No acute respiratory insufficiency or compromise. Supportive home care and follow-up outpatient with family doctor is recommended according to the following discharge home plan that the patient verbalizes understanding and agreement with. The patient tolerated their visit well. I evaluated the patient. The physician was available for consultation as needed. The patient and / or the family were informed of the results of any tests, a time was given to answer questions, a plan was proposed and they agreed with plan. Home in stable condition to drink plenty of water, elevate head and shoulders at rest, avoid all tobacco smoke, and use medications as written. Monitor for gradual improvement. Self isolate until COVID-19 testing result returns, which is likely in the next 48 hours. We will call you if it is positive. If it is positive you will need to continue self isolating for a total of 10 days after symptoms appeared including at least 24 hours of being fever free. Otherwise you may assume that you are negative for COVID-19 and may return to work when fever free for 24 hours. Follow-up outpatient with your family doctor for further care and treatment. Return to the emergency department for any emergency worsening or concern. CLINICAL IMPRESSION:  1. Viral URI with cough    2.  COVID-19 virus test result unknown        DISPOSITION Decision To Discharge 07/01/2021 09:32:46 PM      PATIENT REFERRED TO:  NAINA Lama - CNP  [address]  [address] [facility] [[email or web address]]

## 2021-07-02 NOTE — CARE COORDINATION
Placed call to patient to follow up on recent ED visit. HIPPA compliant message left with RN contact information.     Lenora Ocasio RN, MSN  Ambulatory Care Manager  [phone]

## 2021-07-06 ENCOUNTER — CARE COORDINATION (OUTPATIENT)
Dept: CARE COORDINATION | Age: 41
End: 2021-07-06

## 2022-07-12 DIAGNOSIS — B35.0 TINEA CAPITIS: ICD-10-CM

## 2022-07-12 RX ORDER — KETOCONAZOLE 20 MG/G
CREAM TOPICAL
Qty: 60 G | Refills: 1 | OUTPATIENT
Start: 2022-07-12

## 2022-08-30 ENCOUNTER — OFFICE VISIT (OUTPATIENT)
Dept: PRIMARY CARE CLINIC | Age: 42
End: 2022-08-30
Payer: COMMERCIAL

## 2022-08-30 VITALS
OXYGEN SATURATION: 99 % | WEIGHT: 109.4 LBS | HEIGHT: 69 IN | HEART RATE: 87 BPM | DIASTOLIC BLOOD PRESSURE: 68 MMHG | SYSTOLIC BLOOD PRESSURE: 112 MMHG | BODY MASS INDEX: 16.2 KG/M2

## 2022-08-30 DIAGNOSIS — Z11.4 SCREENING FOR HIV (HUMAN IMMUNODEFICIENCY VIRUS): ICD-10-CM

## 2022-08-30 DIAGNOSIS — Z13.220 SCREENING FOR LIPID DISORDERS: ICD-10-CM

## 2022-08-30 DIAGNOSIS — Z13.1 SCREENING FOR DIABETES MELLITUS: ICD-10-CM

## 2022-08-30 DIAGNOSIS — R63.6 UNDERWEIGHT: ICD-10-CM

## 2022-08-30 DIAGNOSIS — Z13.29 SCREENING FOR THYROID DISORDER: ICD-10-CM

## 2022-08-30 DIAGNOSIS — Z00.00 WELL ADULT EXAM: Primary | ICD-10-CM

## 2022-08-30 DIAGNOSIS — Z91.199 NONCOMPLIANCE: ICD-10-CM

## 2022-08-30 DIAGNOSIS — B35.0 TINEA CAPITIS: ICD-10-CM

## 2022-08-30 DIAGNOSIS — Z13.228 ENCOUNTER FOR SCREENING FOR OTHER METABOLIC DISORDERS: ICD-10-CM

## 2022-08-30 DIAGNOSIS — Z11.59 NEED FOR HEPATITIS C SCREENING TEST: ICD-10-CM

## 2022-08-30 DIAGNOSIS — R21 RASH AND NONSPECIFIC SKIN ERUPTION: ICD-10-CM

## 2022-08-30 DIAGNOSIS — R63.0 POOR APPETITE: ICD-10-CM

## 2022-08-30 DIAGNOSIS — Z86.19 HISTORY OF HEPATITIS A: ICD-10-CM

## 2022-08-30 LAB
BACTERIA: ABNORMAL /HPF
BASOPHILS ABSOLUTE: 0 K/UL (ref 0–0.2)
BASOPHILS RELATIVE PERCENT: 1.2 %
BILIRUBIN URINE: NEGATIVE
BLOOD, URINE: NEGATIVE
C-REACTIVE PROTEIN: <3 MG/L (ref 0–5.1)
CALCIUM OXALATE CRYSTALS: PRESENT
CLARITY: CLEAR
COLOR: ABNORMAL
EOSINOPHILS ABSOLUTE: 0.3 K/UL (ref 0–0.6)
EOSINOPHILS RELATIVE PERCENT: 8.7 %
EPITHELIAL CELLS, UA: 0 /HPF (ref 0–5)
GLUCOSE URINE: NEGATIVE MG/DL
HAV IGM SER IA-ACNC: NORMAL
HCT VFR BLD CALC: 40.3 % (ref 40.5–52.5)
HEMOGLOBIN: 13.6 G/DL (ref 13.5–17.5)
HEPATITIS B CORE IGM ANTIBODY: NORMAL
HEPATITIS B SURFACE ANTIGEN INTERPRETATION: NORMAL
HEPATITIS C ANTIBODY INTERPRETATION: NORMAL
HYALINE CASTS: 0 /LPF (ref 0–8)
IRON SATURATION: 17 % (ref 20–50)
IRON: 44 UG/DL (ref 59–158)
KETONES, URINE: ABNORMAL MG/DL
LACTATE DEHYDROGENASE: 162 U/L (ref 100–190)
LEUKOCYTE ESTERASE, URINE: NEGATIVE
LYMPHOCYTES ABSOLUTE: 1.3 K/UL (ref 1–5.1)
LYMPHOCYTES RELATIVE PERCENT: 38.8 %
MCH RBC QN AUTO: 31.1 PG (ref 26–34)
MCHC RBC AUTO-ENTMCNC: 33.8 G/DL (ref 31–36)
MCV RBC AUTO: 92 FL (ref 80–100)
MICROSCOPIC EXAMINATION: ABNORMAL
MONOCYTES ABSOLUTE: 0.3 K/UL (ref 0–1.3)
MONOCYTES RELATIVE PERCENT: 9.5 %
NEUTROPHILS ABSOLUTE: 1.4 K/UL (ref 1.7–7.7)
NEUTROPHILS RELATIVE PERCENT: 41.8 %
NITRITE, URINE: NEGATIVE
PDW BLD-RTO: 14 % (ref 12.4–15.4)
PH UA: 5.5 (ref 5–8)
PLATELET # BLD: 225 K/UL (ref 135–450)
PMV BLD AUTO: 8.7 FL (ref 5–10.5)
PROTEIN UA: NEGATIVE MG/DL
RBC # BLD: 4.38 M/UL (ref 4.2–5.9)
RBC UA: 2 /HPF (ref 0–4)
SEDIMENTATION RATE, ERYTHROCYTE: 3 MM/HR (ref 0–15)
SPECIFIC GRAVITY UA: 1.03 (ref 1–1.03)
TOTAL IRON BINDING CAPACITY: 253 UG/DL (ref 260–445)
URINE TYPE: ABNORMAL
UROBILINOGEN, URINE: 1 E.U./DL
WBC # BLD: 3.3 K/UL (ref 4–11)
WBC UA: 1 /HPF (ref 0–5)

## 2022-08-30 PROCEDURE — 4004F PT TOBACCO SCREEN RCVD TLK: CPT | Performed by: NURSE PRACTITIONER

## 2022-08-30 PROCEDURE — 99396 PREV VISIT EST AGE 40-64: CPT | Performed by: NURSE PRACTITIONER

## 2022-08-30 PROCEDURE — G8419 CALC BMI OUT NRM PARAM NOF/U: HCPCS | Performed by: NURSE PRACTITIONER

## 2022-08-30 PROCEDURE — G8427 DOCREV CUR MEDS BY ELIG CLIN: HCPCS | Performed by: NURSE PRACTITIONER

## 2022-08-30 PROCEDURE — 36415 COLL VENOUS BLD VENIPUNCTURE: CPT | Performed by: NURSE PRACTITIONER

## 2022-08-30 PROCEDURE — 99213 OFFICE O/P EST LOW 20 MIN: CPT | Performed by: NURSE PRACTITIONER

## 2022-08-30 RX ORDER — KETOCONAZOLE 20 MG/ML
SHAMPOO TOPICAL
Qty: 120 ML | Refills: 2 | Status: SHIPPED | OUTPATIENT
Start: 2022-08-30

## 2022-08-30 RX ORDER — TRIAMCINOLONE ACETONIDE 1 MG/G
CREAM TOPICAL
Qty: 1 EACH | Refills: 0 | Status: SHIPPED | OUTPATIENT
Start: 2022-08-30

## 2022-08-30 ASSESSMENT — PATIENT HEALTH QUESTIONNAIRE - PHQ9
SUM OF ALL RESPONSES TO PHQ QUESTIONS 1-9: 0
SUM OF ALL RESPONSES TO PHQ QUESTIONS 1-9: 0
SUM OF ALL RESPONSES TO PHQ9 QUESTIONS 1 & 2: 0
1. LITTLE INTEREST OR PLEASURE IN DOING THINGS: 0
2. FEELING DOWN, DEPRESSED OR HOPELESS: 0
SUM OF ALL RESPONSES TO PHQ QUESTIONS 1-9: 0
SUM OF ALL RESPONSES TO PHQ QUESTIONS 1-9: 0

## 2022-08-30 ASSESSMENT — ENCOUNTER SYMPTOMS
WHEEZING: 0
CHEST TIGHTNESS: 0
NAUSEA: 0
DIARRHEA: 0
SHORTNESS OF BREATH: 0
SORE THROAT: 0
BACK PAIN: 1
EYE PAIN: 0
TROUBLE SWALLOWING: 0
SINUS PAIN: 0
COUGH: 0
BLOOD IN STOOL: 0
FACIAL SWELLING: 0
VOMITING: 0
CONSTIPATION: 0
ABDOMINAL PAIN: 0

## 2022-08-30 NOTE — PROGRESS NOTES
2022    Zoë Crespo (:  1980) josephine 43 y.o. male, here for evaluation of the following medical concerns:    HPI  Well Adult Physical   Patient here for a comprehensive physical exam.The patient reports problems -patient comes to clinic for annual wellness visit. Patient had a myriad of complaints at first visit but did not complete previous blood work or follow up in the 4 week recall time frame. Poor appetite/ Underweight- This is a chronic problem. States that it had started in his home country. Denies nausea, vomiting, abdominal pain. Chart review shows patient had tested for Hep A antibody in the past, but unknown if he had been treated. He does not recall anyone telling him or receiving treatment. Eats twice day. Typically traditional food. Does not eat meat often. Denies nausea, vomiting, night sweats, dysuria, hematuria, but reports urinary urgency. Patient is working. Denies cough or SOB. Patient reports rash of neck and head for several years. Patient was given steroid and antifungal cream by myself last year. Reports the medication did help, but returned after stopping medication. He did not follow up with dermatology. Patient smokes 1 pack per week. Patient does not drink alcohol. Does not use any other drugs. Patient rides his bike once a week. Dental- UTD  Eye exam- Overdue    Do you take any herbs or supplements that were not prescribed by a doctor? no Are you taking calcium supplements? no Are you taking aspirin daily? No    Review of Systems   Constitutional:  Positive for appetite change, fatigue and unexpected weight change. Negative for chills and fever. HENT:  Negative for congestion, ear pain, facial swelling, sinus pain, sore throat and trouble swallowing. Eyes:  Negative for pain and visual disturbance. Respiratory:  Negative for cough, chest tightness, shortness of breath and wheezing.     Cardiovascular:  Negative for chest pain, palpitations and leg swelling. Gastrointestinal:  Negative for abdominal pain, blood in stool, constipation, diarrhea, nausea and vomiting. Endocrine: Positive for polyuria. Negative for polydipsia. Genitourinary:  Positive for urgency. Negative for difficulty urinating, dysuria and hematuria. Musculoskeletal:  Positive for back pain. Negative for arthralgias and myalgias. Skin:  Positive for rash. Negative for pallor. Allergic/Immunologic: Negative for environmental allergies and food allergies. Neurological:  Negative for dizziness, syncope, weakness, numbness and headaches. Hematological:  Negative for adenopathy. Does not bruise/bleed easily. Psychiatric/Behavioral:  Negative for dysphoric mood and suicidal ideas. Prior to Visit Medications    Medication Sig Taking? Authorizing Provider   triamcinolone (KENALOG) 0.1 % cream Apply topically 2 times daily for 2 weeks. Yes NAINA Castaneda CNP   ketoconazole (NIZORAL) 2 % shampoo Apply topically to scalp daily for 1 month. Yes NAINA Castaneda CNP   fluticasone (FLONASE) 50 MCG/ACT nasal spray Use 2 sprays in each nostril once daily for the first 3 days, then decrease to 1 spray in each nostril once daily. Yes Duarte Trejo PA-C   naproxen (NAPROSYN) 500 MG tablet Take 1 tablet by mouth 2 times daily (with meals) Yes NAINA Castaneda CNP   ibuprofen (IBU) 600 MG tablet Take 1 tablet by mouth every 6 hours as needed for Pain Yes Jc Armando PA-C   acetaminophen (APAP EXTRA STRENGTH) 500 MG tablet Take 2 tablets by mouth every 6 hours as needed for Pain Yes Jc Armando PA-C   Dextromethorphan-guaiFENesin  MG/5ML SYRP Take 10 mLs by mouth every 6 hours as needed for Cough  Patient not taking: Reported on 8/30/2022  Duarte Trejo PA-C        No Known Allergies    Past Medical History:   Diagnosis Date    Influenza B 02/22/2020       No past surgical history on file.     Social History     Socioeconomic History Marital status:      Spouse name: Not on file    Number of children: Not on file    Years of education: Not on file    Highest education level: Not on file   Occupational History    Not on file   Tobacco Use    Smoking status: Some Days     Packs/day: 0.25     Types: Cigarettes    Smokeless tobacco: Never   Vaping Use    Vaping Use: Never used   Substance and Sexual Activity    Alcohol use: No    Drug use: Never    Sexual activity: Not on file   Other Topics Concern    Not on file   Social History Narrative    Not on file     Social Determinants of Health     Financial Resource Strain: Not on file   Food Insecurity: Not on file   Transportation Needs: Not on file   Physical Activity: Not on file   Stress: Not on file   Social Connections: Not on file   Intimate Partner Violence: Not on file   Housing Stability: Not on file        No family history on file. Vitals:    08/30/22 1026   BP: 112/68   Pulse: 87   SpO2: 99%   Weight: 109 lb 6.4 oz (49.6 kg)   Height: 5' 9\" (1.753 m)     Estimated body mass index is 16.16 kg/m² as calculated from the following:    Height as of this encounter: 5' 9\" (1.753 m). Weight as of this encounter: 109 lb 6.4 oz (49.6 kg). Physical Exam  Vitals reviewed. Constitutional:       Appearance: He is underweight. HENT:      Head: Normocephalic. Right Ear: Tympanic membrane, ear canal and external ear normal.      Left Ear: Tympanic membrane, ear canal and external ear normal.      Nose: Nose normal.      Mouth/Throat:      Mouth: Mucous membranes are moist.      Pharynx: Oropharynx is clear. Eyes:      General:         Right eye: No discharge. Left eye: No discharge. Extraocular Movements: Extraocular movements intact. Conjunctiva/sclera: Conjunctivae normal.      Pupils: Pupils are equal, round, and reactive to light. Cardiovascular:      Rate and Rhythm: Normal rate and regular rhythm. Pulses: Normal pulses.       Heart sounds: Normal heart sounds. No murmur heard. Pulmonary:      Effort: Pulmonary effort is normal.      Breath sounds: Normal breath sounds. No wheezing. Chest:      Chest wall: No tenderness. Abdominal:      General: Bowel sounds are normal.      Palpations: Abdomen is soft. There is no mass. Tenderness: There is no abdominal tenderness. There is no guarding or rebound. Musculoskeletal:         General: Normal range of motion. Cervical back: Normal range of motion and neck supple. Lumbar back: Tenderness present. Lymphadenopathy:      Cervical: No cervical adenopathy. Skin:     General: Skin is warm and dry. Capillary Refill: Capillary refill takes less than 2 seconds. Coloration: Skin is not pale. Findings: Rash present. No erythema or lesion. Rash is scaling. Comments:   Scaling annular patches on neck and scalp. Pruritic per patient   Neurological:      General: No focal deficit present. Mental Status: He is alert and oriented to person, place, and time. Deep Tendon Reflexes: Reflexes are normal and symmetric. Psychiatric:         Attention and Perception: He is inattentive. Mood and Affect: Mood normal.         Speech: Speech normal.         Behavior: Behavior normal. Behavior is cooperative. Judgment: Judgment is inappropriate. Comments:          Josue Cuevas was seen today for annual exam, blood work and rash. Diagnoses and all orders for this visit:    Well adult exam  All ages:   3. Exercise regularly. Ideally, we should all be getting 30 minutes of exercise 5 days a week to prevent weight gain, improve heart health, prevent arthritis, boost mood and immunity, and encourage good sleep. Exercise is better than any medication! 2. Eat a balanced diet with at least 5 servings of fruits and vegetables daily. Reduce salt and sodium, fats, and sugars. 3. Wear sunscreen when out in the sun.  Reapply every 2-3 hours or after swimming or excessive sweating. 4. Get a yearly flu vaccine and keep your tetanus booster up to date every 10 years. 5. Do not smoke or chew! If you smoke, ask your doctor for help to quit. 6. Alcohol is acceptable in moderation, but do not drink more than one drink daily. Poor appetite  -     Urinalysis with Microscopic  -     Sedimentation Rate  -     Lactate Dehydrogenase  -     C-Reactive Protein  -     Hepatitis Panel, Acute  -     Syphilis Antibody Cascading Reflex  -     Vitamin D 25 Hydroxy  -     Iron and TIBC  -     Ferritin  -     Path Review, Smear  -     THYROID PEROXIDASE ANTIBODY  -     AFL - Swapnil Gonzalez MD, Gastroenterology, [facility]  -     AUBREE  -     Rheumatoid Factor    History of hepatitis A  -     Hepatitis Panel, Acute  -     Maribel Mcgee MD, Gastroenterology, [facility]    Underweight  -     CBC with Auto Differential  -     Comprehensive Metabolic Panel  -     Hemoglobin A1C  -     TSH with Reflex  -     Sedimentation Rate  -     Lactate Dehydrogenase  -     Iron and TIBC  -     Ferritin  -     Path Review, Smear  -     THYROID PEROXIDASE ANTIBODY  -     ART - Swapnil Gonzalez MD, Gastroenterology, [facility]  -     AUBREE  -     Rheumatoid Factor    Tinea capitis  -     ketoconazole (NIZORAL) 2 % shampoo; Apply topically to scalp daily for 1 month. Rash and nonspecific skin eruption  -     triamcinolone (KENALOG) 0.1 % cream; Apply topically 2 times daily for 2 weeks.   -     Og Georges MD, Dermatology, [facility]    Need for hepatitis C screening test  -     Hepatitis C Antibody    Encounter for screening for other metabolic disorders  -     CBC with Auto Differential  -     Comprehensive Metabolic Panel    Screening for diabetes mellitus  -     Hemoglobin A1C    Screening for thyroid disorder  -     TSH with Reflex    Screening for lipid disorders  -     Lipid Panel    Screening for HIV (human immunodeficiency virus)  -     HIV Screen    Noncompliance  Patient had appointments at 21 , but stayed in his car and did not check-in until 1024. Patient had no showed his last visit. Patient was given extensive lab work up to be done April of last year and follow-up within 4 weeks for weight loss. Patient states that he was \"too busy\" to make follow-up. Patient continues on to have full phone conversations while in clinic. Patient was kindly asked to put phone away during examination however patient continued on scheduling his dental appointment. I did discuss with patient that I will  not permit this behavior going forward. He is to come back in 3 months for follow-up, if he continues to be noncompliant he will have to establish with other provider. Return in about 3 months (around 11/30/2022) for Re-evaluation.

## 2022-08-31 DIAGNOSIS — E55.9 VITAMIN D DEFICIENCY: Primary | ICD-10-CM

## 2022-08-31 DIAGNOSIS — R79.0 ABNORMAL RESULT OF IRON PROFILE TESTING: ICD-10-CM

## 2022-08-31 DIAGNOSIS — R79.0 ABNORMAL RESULT OF IRON PROFILE TESTING: Primary | ICD-10-CM

## 2022-08-31 LAB
A/G RATIO: 1.6 (ref 1.1–2.2)
ALBUMIN SERPL-MCNC: 4.2 G/DL (ref 3.4–5)
ALP BLD-CCNC: 64 U/L (ref 40–129)
ALT SERPL-CCNC: 14 U/L (ref 10–40)
ANION GAP SERPL CALCULATED.3IONS-SCNC: 13 MMOL/L (ref 3–16)
ANTI-NUCLEAR ANTIBODY (ANA): NEGATIVE
AST SERPL-CCNC: 25 U/L (ref 15–37)
BILIRUB SERPL-MCNC: <0.2 MG/DL (ref 0–1)
BLOOD SMEAR REVIEW: NORMAL
BUN BLDV-MCNC: 13 MG/DL (ref 7–20)
CALCIUM SERPL-MCNC: 9 MG/DL (ref 8.3–10.6)
CHLORIDE BLD-SCNC: 107 MMOL/L (ref 99–110)
CHOLESTEROL, TOTAL: 173 MG/DL (ref 0–199)
CO2: 22 MMOL/L (ref 21–32)
CREAT SERPL-MCNC: 0.8 MG/DL (ref 0.9–1.3)
ESTIMATED AVERAGE GLUCOSE: 105.4 MG/DL
FERRITIN: 144.4 NG/ML (ref 30–400)
GFR AFRICAN AMERICAN: >60
GFR NON-AFRICAN AMERICAN: >60
GLUCOSE BLD-MCNC: 95 MG/DL (ref 70–99)
HBA1C MFR BLD: 5.3 %
HDLC SERPL-MCNC: 48 MG/DL (ref 40–60)
HEMATOLOGY PATH CONSULT: NORMAL
HEPATITIS C ANTIBODY INTERPRETATION: NORMAL
HIV AG/AB: NORMAL
HIV ANTIGEN: NORMAL
HIV-1 ANTIBODY: NORMAL
HIV-2 AB: NORMAL
LDL CHOLESTEROL CALCULATED: 115 MG/DL
POTASSIUM SERPL-SCNC: 4.3 MMOL/L (ref 3.5–5.1)
RHEUMATOID FACTOR: <10 IU/ML
SODIUM BLD-SCNC: 142 MMOL/L (ref 136–145)
THYROID PEROXIDASE (TPO) ABS: 9 IU/ML
TOTAL PROTEIN: 6.8 G/DL (ref 6.4–8.2)
TOTAL SYPHILLIS IGG/IGM: NORMAL
TRIGL SERPL-MCNC: 51 MG/DL (ref 0–150)
TSH REFLEX: 1.46 UIU/ML (ref 0.27–4.2)
VITAMIN D 25-HYDROXY: 19.9 NG/ML
VLDLC SERPL CALC-MCNC: 10 MG/DL

## 2022-08-31 RX ORDER — ERGOCALCIFEROL 1.25 MG/1
50000 CAPSULE ORAL WEEKLY
Qty: 12 CAPSULE | Refills: 1 | Status: SHIPPED | OUTPATIENT
Start: 2022-08-31

## 2022-09-02 LAB — IGA: 232 MG/DL (ref 70–400)

## 2022-09-03 LAB — TISSUE TRANSGLUTAMINASE IGA: <0.5 U/ML (ref 0–14)

## 2022-09-06 LAB
HEMOGLOBIN ELECTROPHORESIS: NORMAL
HGB ELECTROPHORESIS INTERP: NORMAL

## 2023-05-16 ENCOUNTER — HOSPITAL ENCOUNTER (EMERGENCY)
Age: 43
Discharge: HOME | End: 2023-05-16
Payer: COMMERCIAL

## 2023-05-16 VITALS
BODY MASS INDEX: 17.17 KG/M2 | HEIGHT: 68 IN | OXYGEN SATURATION: 96 % | SYSTOLIC BLOOD PRESSURE: 125 MMHG | TEMPERATURE: 98.9 F | WEIGHT: 113.32 LBS | HEART RATE: 76 BPM | RESPIRATION RATE: 14 BRPM | DIASTOLIC BLOOD PRESSURE: 93 MMHG

## 2023-05-16 DIAGNOSIS — R11.0 NAUSEA: Primary | ICD-10-CM

## 2023-05-16 DIAGNOSIS — R19.7 DIARRHEA, UNSPECIFIED TYPE: ICD-10-CM

## 2023-05-16 DIAGNOSIS — R10.9 ABDOMINAL CRAMPING: ICD-10-CM

## 2023-05-16 LAB
FLUAV RNA UPPER RESP QL NAA+PROBE: NEGATIVE
FLUBV AG NPH QL: NEGATIVE
SARS-COV-2 RDRP RESP QL NAA+PROBE: NOT DETECTED

## 2023-05-16 PROCEDURE — 87804 INFLUENZA ASSAY W/OPTIC: CPT

## 2023-05-16 PROCEDURE — 87635 SARS-COV-2 COVID-19 AMP PRB: CPT

## 2023-05-16 PROCEDURE — 99283 EMERGENCY DEPT VISIT LOW MDM: CPT

## 2023-05-16 RX ORDER — LOPERAMIDE HYDROCHLORIDE 2 MG/1
2 CAPSULE ORAL 4 TIMES DAILY PRN
Qty: 24 CAPSULE | Refills: 0 | Status: SHIPPED | OUTPATIENT
Start: 2023-05-16 | End: 2023-05-26

## 2023-05-16 RX ORDER — ONDANSETRON 4 MG/1
4 TABLET, FILM COATED ORAL EVERY 8 HOURS PRN
Qty: 20 TABLET | Refills: 0 | Status: SHIPPED | OUTPATIENT
Start: 2023-05-16

## 2023-05-16 RX ORDER — HYOSCYAMINE SULFATE 0.12 MG/1
0.12 TABLET SUBLINGUAL EVERY 4 HOURS PRN
Qty: 15 EACH | Refills: 0 | Status: SHIPPED | OUTPATIENT
Start: 2023-05-16

## 2023-05-16 ASSESSMENT — PAIN SCALES - GENERAL: PAINLEVEL_OUTOF10: 6

## 2023-05-16 ASSESSMENT — PAIN DESCRIPTION - LOCATION: LOCATION: ABDOMEN

## 2023-05-16 ASSESSMENT — PAIN DESCRIPTION - DESCRIPTORS: DESCRIPTORS: SORE

## 2023-05-16 ASSESSMENT — PAIN DESCRIPTION - ORIENTATION: ORIENTATION: MID

## 2023-05-16 ASSESSMENT — PAIN DESCRIPTION - PAIN TYPE: TYPE: ACUTE PAIN

## 2023-05-16 NOTE — ED PROVIDER NOTES
[facility] ENCOUNTER        Pt Name: Tirso Bynum  MRN: [id number]  [place] 1980  Date of evaluation: 5/16/2023  Provider: Barak Hummel PA-C  PCP: NAINA Rossi CNP  Note Started: 6:43 PM EDT 5/16/23      MADISON. I have evaluated this patient. My supervising physician was available for consultation. CHIEF COMPLAINT       Chief Complaint   Patient presents with    Diarrhea     X1day, with nausea and abd discomfort. Pt states his wife and kids are also sick       HISTORY OF PRESENT ILLNESS: 1 or more Elements     History From: patient  Limitations to history : None    Tirso Bynum is a 37 y.o. male who presents to the emergency department by private vehicle complaining of nausea, abdominal cramping and diarrhea x2 days. Patient states wife and kids are sick with similar symptoms. Patient denies any fevers, chills, upper respiratory plaints, chest pain or shortness of breath. He denies any travel or recent antibiotics. He denies any blood in diarrhea. Only 1 episode of diarrhea today. No other complaints at this time. Nursing Notes were all reviewed and agreed with or any disagreements were addressed in the HPI. REVIEW OF SYSTEMS :      Review of Systems    Positives and Pertinent negatives as per HPI. SURGICAL HISTORY   History reviewed. No pertinent surgical history. CURRENTMEDICATIONS       Previous Medications    No medications on file       ALLERGIES     Patient has no known allergies. FAMILYHISTORY     History reviewed. No pertinent family history.      SOCIAL HISTORY       Social History     Tobacco Use    Smoking status: Some Days     Packs/day: 0.25     Types: Cigarettes    Smokeless tobacco: Never   Vaping Use    Vaping Use: Never used   Substance Use Topics    Alcohol use: No    Drug use: Never       SCREENINGS        Hersey Coma Scale  Eye Opening: Spontaneous  Best Verbal Response: Oriented  Best

## 2023-05-16 NOTE — DISCHARGE INSTRUCTIONS
Suspect you have a viral illness given other household members are sick with similar symptoms. If you been having uncontrolled diarrhea, concerns for dehydration, new or worsening abdominal pain, blood in your stool or any other concerning symptoms return to the ED for reevaluation. If you are having uncontrolled diarrhea in the day you can try Imodium. If you feel nauseous you can try Zofran. Levsin is for abdominal cramping. COVID-19 and flu are negative. Continue to stay hydrated, make sure you drink clear liquids or drinks with electrolytes like Gatorade.

## 2023-05-16 NOTE — ED NOTES
Started feeling ill yesterday. Diarrhea once today (none yesterday). Intermittent mid abd pain since 0500-pain now at 6. Pain lasts a few hours. No OTC pain meds/diarrhea meds. Nausea but not vomiting.    wife and kids are ill with similar illness-vomiting and diarrhea.      Kelsy Peguero RN  05/16/23 5859

## 2023-05-17 NOTE — ED NOTES
Explained new orders. Mild nausea. No vomiting. No diarrhea.   Abd pain at [facility]  05/17/23 6878

## 2023-05-17 NOTE — ED NOTES
Drinking ginger ale. No vomiting/diarrhea. Abd pain at 4. Discharge instructions with pt. Explained rx's. Feels better.      Fouzia Garcia, RN  05/17/23 4427

## 2023-10-31 ENCOUNTER — HOSPITAL ENCOUNTER (EMERGENCY)
Age: 43
Discharge: HOME | End: 2023-10-31

## 2023-10-31 VITALS
BODY MASS INDEX: 16.63 KG/M2 | HEART RATE: 72 BPM | DIASTOLIC BLOOD PRESSURE: 62 MMHG | WEIGHT: 109.35 LBS | OXYGEN SATURATION: 99 % | RESPIRATION RATE: 16 BRPM | TEMPERATURE: 100.5 F | SYSTOLIC BLOOD PRESSURE: 109 MMHG

## 2023-10-31 DIAGNOSIS — J10.1 INFLUENZA A: Primary | ICD-10-CM

## 2023-10-31 LAB
FLUAV RNA UPPER RESP QL NAA+PROBE: POSITIVE
FLUBV AG NPH QL: NEGATIVE
SARS-COV-2 RDRP RESP QL NAA+PROBE: NOT DETECTED

## 2023-10-31 PROCEDURE — 87635 SARS-COV-2 COVID-19 AMP PRB: CPT

## 2023-10-31 PROCEDURE — 99283 EMERGENCY DEPT VISIT LOW MDM: CPT

## 2023-10-31 PROCEDURE — 87804 INFLUENZA ASSAY W/OPTIC: CPT

## 2023-10-31 RX ORDER — OSELTAMIVIR PHOSPHATE 75 MG/1
75 CAPSULE ORAL 2 TIMES DAILY
Qty: 10 CAPSULE | Refills: 0 | Status: SHIPPED | OUTPATIENT
Start: 2023-10-31 | End: 2023-11-05

## 2023-10-31 RX ORDER — BENZONATATE 100 MG/1
100-200 CAPSULE ORAL 3 TIMES DAILY PRN
Qty: 30 CAPSULE | Refills: 0 | Status: SHIPPED | OUTPATIENT
Start: 2023-10-31 | End: 2023-11-07

## 2023-10-31 RX ORDER — IBUPROFEN 800 MG/1
800 TABLET ORAL EVERY 8 HOURS PRN
Qty: 20 TABLET | Refills: 0 | Status: SHIPPED | OUTPATIENT
Start: 2023-10-31

## 2023-10-31 RX ORDER — ONDANSETRON 4 MG/1
4 TABLET, ORALLY DISINTEGRATING ORAL 3 TIMES DAILY PRN
Qty: 21 TABLET | Refills: 0 | Status: SHIPPED | OUTPATIENT
Start: 2023-10-31

## 2023-10-31 RX ORDER — ACETAMINOPHEN 500 MG
1000 TABLET ORAL 3 TIMES DAILY PRN
Qty: 180 TABLET | Refills: 0 | Status: SHIPPED | OUTPATIENT
Start: 2023-10-31

## 2023-10-31 ASSESSMENT — PAIN - FUNCTIONAL ASSESSMENT: PAIN_FUNCTIONAL_ASSESSMENT: 0-10

## 2023-10-31 ASSESSMENT — PAIN SCALES - GENERAL: PAINLEVEL_OUTOF10: 10

## 2023-10-31 ASSESSMENT — PAIN DESCRIPTION - LOCATION: LOCATION: HEAD

## 2023-10-31 NOTE — ED NOTES
AVS reviewed with patient. Verbalized understanding. AVS was printed and given to patient. Prescriptions sent electronically to patients pharmacy of choice.       Pilo RAYO RN (Tracee)  10/31/23 2366

## 2023-10-31 NOTE — ED PROVIDER NOTES
[address] [address]  Dept: [phone]  Loc: [facility] ENCOUNTER        MADISON. I have evaluated this patient. CHIEF COMPLAINT    Chief Complaint   Patient presents with    Illness     C/o headache, body aches and chills since yesterday       HPI    Leny Weems is a 37 y.o. male who presents with generally feeling unwell and fatigued associated with cough and generalized myalgia. Onset was yesterday. The duration has been constant since the onset. There are no alleviating factors. The severity of the myalgia pain is 10/10. Chills. No measured fevers. Headache, not sudden onset or the worst headache of his life, no neck pain or stiffness, no diplopia or visual changes. 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 Denies any pharyngitis runny or stuffy nose. No consistent cough. No shortness of breath or chest pain. No GI symptoms. Came to the ED for further evaluation and treatment    REVIEW OF SYSTEMS    Pulmonary: no sore throat, see HPI, no hemoptysis  General: no measured fevers, + subjective fevers with chills, + myalgia  GI: no nausea, no vomiting  All other systems reviewed and are negative. PAST MEDICAL AND SURGICAL HISTORY    Past Medical History:   Diagnosis Date    Influenza B 02/22/2020     No past surgical history on file.     CURRENT MEDICATIONS  (may include discharge medications prescribed in the ED)  Current Outpatient Rx   Medication Sig Dispense Refill    oseltamivir (TAMIFLU) 75 MG capsule Take 1 capsule by mouth 2 times daily for 5 days 10 capsule 0    benzonatate (TESSALON) 100 MG capsule Take 1-2 capsules by mouth 3 times daily as needed for Cough 30 capsule 0    ondansetron (ZOFRAN-ODT) 4 MG disintegrating tablet Take 1 tablet by mouth 3 times daily as needed for

## 2024-12-16 ENCOUNTER — OFFICE VISIT (OUTPATIENT)
Dept: PRIMARY CARE CLINIC | Age: 44
End: 2024-12-16
Payer: MEDICAID

## 2024-12-16 VITALS
SYSTOLIC BLOOD PRESSURE: 112 MMHG | HEIGHT: 68 IN | BODY MASS INDEX: 17.88 KG/M2 | DIASTOLIC BLOOD PRESSURE: 62 MMHG | WEIGHT: 118 LBS

## 2024-12-16 DIAGNOSIS — D72.819 LEUKOPENIA, UNSPECIFIED TYPE: ICD-10-CM

## 2024-12-16 DIAGNOSIS — Z13.228 ENCOUNTER FOR SCREENING FOR OTHER METABOLIC DISORDERS: ICD-10-CM

## 2024-12-16 DIAGNOSIS — D48.9 NEOPLASM OF UNCERTAIN BEHAVIOR: ICD-10-CM

## 2024-12-16 DIAGNOSIS — Z13.1 SCREENING FOR DIABETES MELLITUS: ICD-10-CM

## 2024-12-16 DIAGNOSIS — H53.8 BLURRED VISION, RIGHT EYE: ICD-10-CM

## 2024-12-16 DIAGNOSIS — L30.9 ECZEMA, UNSPECIFIED TYPE: ICD-10-CM

## 2024-12-16 DIAGNOSIS — Z12.11 SCREENING FOR COLON CANCER: ICD-10-CM

## 2024-12-16 DIAGNOSIS — Z13.29 SCREENING FOR THYROID DISORDER: ICD-10-CM

## 2024-12-16 DIAGNOSIS — Z13.220 SCREENING FOR LIPID DISORDERS: ICD-10-CM

## 2024-12-16 DIAGNOSIS — Z00.00 WELL ADULT EXAM: Primary | ICD-10-CM

## 2024-12-16 LAB
ALBUMIN SERPL-MCNC: 4.3 G/DL (ref 3.4–5)
ALBUMIN/GLOB SERPL: 1.6 {RATIO} (ref 1.1–2.2)
ALP SERPL-CCNC: 64 U/L (ref 40–129)
ALT SERPL-CCNC: 16 U/L (ref 10–40)
ANION GAP SERPL CALCULATED.3IONS-SCNC: 15 MMOL/L (ref 3–16)
AST SERPL-CCNC: 23 U/L (ref 15–37)
BASOPHILS # BLD: 0.1 K/UL (ref 0–0.2)
BASOPHILS NFR BLD: 1.2 %
BILIRUB SERPL-MCNC: 0.4 MG/DL (ref 0–1)
BUN SERPL-MCNC: 15 MG/DL (ref 7–20)
CALCIUM SERPL-MCNC: 9.8 MG/DL (ref 8.3–10.6)
CHLORIDE SERPL-SCNC: 99 MMOL/L (ref 99–110)
CHOLEST SERPL-MCNC: 193 MG/DL (ref 0–199)
CO2 SERPL-SCNC: 27 MMOL/L (ref 21–32)
CREAT SERPL-MCNC: 0.9 MG/DL (ref 0.9–1.3)
DEPRECATED RDW RBC AUTO: 13.8 % (ref 12.4–15.4)
EOSINOPHIL # BLD: 0.3 K/UL (ref 0–0.6)
EOSINOPHIL NFR BLD: 6.1 %
FERRITIN SERPL IA-MCNC: 88.1 NG/ML (ref 30–400)
GFR SERPLBLD CREATININE-BSD FMLA CKD-EPI: >90 ML/MIN/{1.73_M2}
GLUCOSE SERPL-MCNC: 100 MG/DL (ref 70–99)
HCT VFR BLD AUTO: 39.5 % (ref 40.5–52.5)
HDLC SERPL-MCNC: 48 MG/DL (ref 40–60)
HGB BLD-MCNC: 13.2 G/DL (ref 13.5–17.5)
IRON SATN MFR SERPL: 42 % (ref 20–50)
IRON SERPL-MCNC: 105 UG/DL (ref 59–158)
LDLC SERPL CALC-MCNC: 107 MG/DL
LYMPHOCYTES # BLD: 1.7 K/UL (ref 1–5.1)
LYMPHOCYTES NFR BLD: 34.5 %
MCH RBC QN AUTO: 31.2 PG (ref 26–34)
MCHC RBC AUTO-ENTMCNC: 33.4 G/DL (ref 31–36)
MCV RBC AUTO: 93.5 FL (ref 80–100)
MONOCYTES # BLD: 0.4 K/UL (ref 0–1.3)
MONOCYTES NFR BLD: 7.1 %
NEUTROPHILS # BLD: 2.6 K/UL (ref 1.7–7.7)
NEUTROPHILS NFR BLD: 51.1 %
PLATELET # BLD AUTO: 278 K/UL (ref 135–450)
PMV BLD AUTO: 10.7 FL (ref 5–10.5)
POTASSIUM SERPL-SCNC: 4.1 MMOL/L (ref 3.5–5.1)
PROT SERPL-MCNC: 7 G/DL (ref 6.4–8.2)
RBC # BLD AUTO: 4.22 M/UL (ref 4.2–5.9)
SODIUM SERPL-SCNC: 141 MMOL/L (ref 136–145)
TIBC SERPL-MCNC: 253 UG/DL (ref 260–445)
TRIGL SERPL-MCNC: 189 MG/DL (ref 0–150)
TSH SERPL DL<=0.005 MIU/L-ACNC: 0.92 UIU/ML (ref 0.27–4.2)
URATE SERPL-MCNC: 4.7 MG/DL (ref 3.5–7.2)
VLDLC SERPL CALC-MCNC: 38 MG/DL
WBC # BLD AUTO: 5 K/UL (ref 4–11)

## 2024-12-16 PROCEDURE — 99396 PREV VISIT EST AGE 40-64: CPT | Performed by: NURSE PRACTITIONER

## 2024-12-16 PROCEDURE — 99214 OFFICE O/P EST MOD 30 MIN: CPT | Performed by: NURSE PRACTITIONER

## 2024-12-16 RX ORDER — KETOCONAZOLE 20 MG/ML
SHAMPOO, SUSPENSION TOPICAL
Qty: 120 ML | Refills: 0 | Status: SHIPPED | OUTPATIENT
Start: 2024-12-16

## 2024-12-16 RX ORDER — TRIAMCINOLONE ACETONIDE 0.25 MG/G
OINTMENT TOPICAL
Qty: 80 G | Refills: 0 | Status: SHIPPED | OUTPATIENT
Start: 2024-12-16 | End: 2024-12-23

## 2024-12-16 SDOH — ECONOMIC STABILITY: FOOD INSECURITY: WITHIN THE PAST 12 MONTHS, THE FOOD YOU BOUGHT JUST DIDN'T LAST AND YOU DIDN'T HAVE MONEY TO GET MORE.: PATIENT DECLINED

## 2024-12-16 SDOH — ECONOMIC STABILITY: INCOME INSECURITY: HOW HARD IS IT FOR YOU TO PAY FOR THE VERY BASICS LIKE FOOD, HOUSING, MEDICAL CARE, AND HEATING?: PATIENT DECLINED

## 2024-12-16 SDOH — ECONOMIC STABILITY: FOOD INSECURITY: WITHIN THE PAST 12 MONTHS, YOU WORRIED THAT YOUR FOOD WOULD RUN OUT BEFORE YOU GOT MONEY TO BUY MORE.: PATIENT DECLINED

## 2024-12-16 ASSESSMENT — ENCOUNTER SYMPTOMS
ABDOMINAL PAIN: 0
TROUBLE SWALLOWING: 0
WHEEZING: 0
ABDOMINAL DISTENTION: 0
EYE ITCHING: 0
VOMITING: 0
BLOOD IN STOOL: 0
CONSTIPATION: 0
PHOTOPHOBIA: 0
SORE THROAT: 0
FACIAL SWELLING: 0
EYE PAIN: 0
COUGH: 0
NAUSEA: 0
DIARRHEA: 0
BACK PAIN: 0
SINUS PAIN: 0
SHORTNESS OF BREATH: 0
RECTAL PAIN: 0
EYE DISCHARGE: 0
CHEST TIGHTNESS: 0
ANAL BLEEDING: 0

## 2024-12-16 ASSESSMENT — PATIENT HEALTH QUESTIONNAIRE - PHQ9
SUM OF ALL RESPONSES TO PHQ9 QUESTIONS 1 & 2: 0
2. FEELING DOWN, DEPRESSED OR HOPELESS: NOT AT ALL
SUM OF ALL RESPONSES TO PHQ QUESTIONS 1-9: 0
1. LITTLE INTEREST OR PLEASURE IN DOING THINGS: NOT AT ALL

## 2024-12-16 NOTE — PROGRESS NOTES
2024    Britni Beasley (:  1980) josephine 44 y.o. male, here for evaluation of the following medical concerns:    HPI    Well Adult Physical   Patient here for a comprehensive physical exam.The patient reports       Patient endorses eye dryness, blurred vision,photophobia over 6 months. He reports he has not been able to see long distance for over a year. He reports last eye screen was at [facility], but was not referred to for formal eye exam.  He denies hx of eye disease, trauma, or eye surgery. Smokes hookah once a week. Does not drink alcohol. Does not wear contacts or eyeglasses.     Patient has hx of leukopenia. Has not followed with hematology in 3 years. He was asked to follow up in several months, but had been lost to follow up. Denies fever, chills, unintentional weight loss,lymphadenopathy,n/v/d, abdominal pain, blood in urine or stool. Weight has stabilized. He was referred to [facility] for evaluation. Celiac testing was normal. Patient reports he eats mostly vegetables, but reports daily meat/protein.     He reports rash on head that had has not improved for years. He has hx of tinea and eczema. He states at one point there was itchy lesion on left side of neck, but it had spontaneously resolved. He states he was previously seen by derm.He was given a steroid cream which did not improve symptoms. He does not believe he had a biopsy of lesion on scalp.     Do you take any herbs or supplements that were not prescribed by a doctor? no Are you taking calcium supplements? no Are you taking aspirin daily? no    Dental and eye exams due  Denies family hx of prostate, colon, or testicular cancer in family. Due for colonoscopy next year      Review of Systems   Constitutional:  Positive for fatigue. Negative for chills and fever.   HENT:  Negative for congestion, drooling, ear pain, facial swelling, mouth sores, sinus pain, sore throat and trouble swallowing.    Eyes:  Positive for visual disturbance.

## 2024-12-17 LAB
25(OH)D3 SERPL-MCNC: 9.3 NG/ML
EST. AVERAGE GLUCOSE BLD GHB EST-MCNC: 99.7 MG/DL
HBA1C MFR BLD: 5.1 %

## 2025-01-16 DIAGNOSIS — L30.9 ECZEMA, UNSPECIFIED TYPE: ICD-10-CM

## 2025-01-16 RX ORDER — KETOCONAZOLE 20 MG/ML
SHAMPOO, SUSPENSION TOPICAL
Qty: 120 ML | Refills: 0 | Status: SHIPPED | OUTPATIENT
Start: 2025-01-16

## 2025-01-16 NOTE — TELEPHONE ENCOUNTER
Last office visit 12/16/2024     Last written 12/16/2024     Next office visit scheduled Visit date not found    Requested Prescriptions     Pending Prescriptions Disp Refills    ketoconazole (NIZORAL) 2 % shampoo [Pharmacy Med Name: KETOCONAZOLE 2% SHAMPOO] 120 mL 0     Sig: APPLY TO AFFECTED AREA, LATHER, AND LEAVE ON FOR 20 MIN 2-X TIMES A WEEK FOR 6 WEEKS.

## 2025-01-23 DIAGNOSIS — E55.9 VITAMIN D DEFICIENCY: Primary | ICD-10-CM

## 2025-01-23 RX ORDER — ERGOCALCIFEROL 1.25 MG/1
50000 CAPSULE, LIQUID FILLED ORAL WEEKLY
Qty: 12 CAPSULE | Refills: 1 | Status: SHIPPED | OUTPATIENT
Start: 2025-01-23